# Patient Record
Sex: FEMALE | Race: NATIVE HAWAIIAN OR OTHER PACIFIC ISLANDER | Employment: UNEMPLOYED | URBAN - METROPOLITAN AREA
[De-identification: names, ages, dates, MRNs, and addresses within clinical notes are randomized per-mention and may not be internally consistent; named-entity substitution may affect disease eponyms.]

---

## 2017-04-11 ENCOUNTER — TELEPHONE (OUTPATIENT)
Dept: INTERNAL MEDICINE CLINIC | Age: 57
End: 2017-04-11

## 2017-05-11 ENCOUNTER — TELEPHONE (OUTPATIENT)
Dept: INTERNAL MEDICINE CLINIC | Age: 57
End: 2017-05-11

## 2019-05-22 ENCOUNTER — OFFICE VISIT (OUTPATIENT)
Dept: INTERNAL MEDICINE CLINIC | Age: 59
End: 2019-05-22
Payer: MEDICARE

## 2019-05-22 VITALS — SYSTOLIC BLOOD PRESSURE: 164 MMHG | OXYGEN SATURATION: 97 % | DIASTOLIC BLOOD PRESSURE: 106 MMHG | HEART RATE: 68 BPM

## 2019-05-22 DIAGNOSIS — E11.8 TYPE 2 DIABETES MELLITUS WITH COMPLICATION, WITHOUT LONG-TERM CURRENT USE OF INSULIN (HCC): ICD-10-CM

## 2019-05-22 DIAGNOSIS — I10 ESSENTIAL HYPERTENSION: ICD-10-CM

## 2019-05-22 DIAGNOSIS — N30.00 ACUTE CYSTITIS WITHOUT HEMATURIA: ICD-10-CM

## 2019-05-22 DIAGNOSIS — R11.15 NON-INTRACTABLE CYCLICAL VOMITING WITH NAUSEA: ICD-10-CM

## 2019-05-22 DIAGNOSIS — I67.9 CVD (CEREBROVASCULAR DISEASE): Primary | ICD-10-CM

## 2019-05-22 DIAGNOSIS — F51.01 PRIMARY INSOMNIA: ICD-10-CM

## 2019-05-22 DIAGNOSIS — E78.2 MIXED HYPERLIPIDEMIA: ICD-10-CM

## 2019-05-22 DIAGNOSIS — E55.9 VITAMIN D DEFICIENCY: ICD-10-CM

## 2019-05-22 DIAGNOSIS — R53.1 LEFT-SIDED WEAKNESS: ICD-10-CM

## 2019-05-22 DIAGNOSIS — E78.00 PURE HYPERCHOLESTEROLEMIA: ICD-10-CM

## 2019-05-22 DIAGNOSIS — G40.909 SEIZURE DISORDER (HCC): ICD-10-CM

## 2019-05-22 LAB
CHP ED QC CHECK: NORMAL
GLUCOSE BLD-MCNC: 85 MG/DL
HBA1C MFR BLD: 5.8 %

## 2019-05-22 PROCEDURE — 99204 OFFICE O/P NEW MOD 45 MIN: CPT | Performed by: INTERNAL MEDICINE

## 2019-05-22 PROCEDURE — G8427 DOCREV CUR MEDS BY ELIG CLIN: HCPCS | Performed by: INTERNAL MEDICINE

## 2019-05-22 PROCEDURE — G8421 BMI NOT CALCULATED: HCPCS | Performed by: INTERNAL MEDICINE

## 2019-05-22 PROCEDURE — 82962 GLUCOSE BLOOD TEST: CPT | Performed by: INTERNAL MEDICINE

## 2019-05-22 PROCEDURE — 83036 HEMOGLOBIN GLYCOSYLATED A1C: CPT | Performed by: INTERNAL MEDICINE

## 2019-05-22 PROCEDURE — 3046F HEMOGLOBIN A1C LEVEL >9.0%: CPT | Performed by: INTERNAL MEDICINE

## 2019-05-22 PROCEDURE — 2022F DILAT RTA XM EVC RTNOPTHY: CPT | Performed by: INTERNAL MEDICINE

## 2019-05-22 PROCEDURE — 3017F COLORECTAL CA SCREEN DOC REV: CPT | Performed by: INTERNAL MEDICINE

## 2019-05-22 PROCEDURE — 1036F TOBACCO NON-USER: CPT | Performed by: INTERNAL MEDICINE

## 2019-05-22 RX ORDER — LANOLIN ALCOHOL/MO/W.PET/CERES
3 CREAM (GRAM) TOPICAL NIGHTLY PRN
Qty: 30 TABLET | Refills: 2 | Status: SHIPPED | OUTPATIENT
Start: 2019-05-22

## 2019-05-22 RX ORDER — HYDROCHLOROTHIAZIDE 25 MG/1
25 TABLET ORAL DAILY
COMMUNITY
End: 2019-05-22 | Stop reason: SDUPTHER

## 2019-05-22 RX ORDER — METFORMIN HYDROCHLORIDE 500 MG/1
500 TABLET, EXTENDED RELEASE ORAL
COMMUNITY
End: 2019-05-22 | Stop reason: SDUPTHER

## 2019-05-22 RX ORDER — HYDROCHLOROTHIAZIDE 25 MG/1
25 TABLET ORAL DAILY
Qty: 30 TABLET | Refills: 5 | Status: SHIPPED | OUTPATIENT
Start: 2019-05-22 | End: 2019-11-05 | Stop reason: SDUPTHER

## 2019-05-22 RX ORDER — LANOLIN ALCOHOL/MO/W.PET/CERES
3 CREAM (GRAM) TOPICAL
COMMUNITY
End: 2019-05-22 | Stop reason: SDUPTHER

## 2019-05-22 RX ORDER — ERGOCALCIFEROL 1.25 MG/1
50000 CAPSULE ORAL WEEKLY
COMMUNITY
End: 2019-05-22 | Stop reason: SDUPTHER

## 2019-05-22 RX ORDER — ATORVASTATIN CALCIUM 40 MG/1
40 TABLET, FILM COATED ORAL DAILY
Qty: 30 TABLET | Refills: 3 | Status: SHIPPED | OUTPATIENT
Start: 2019-05-22 | End: 2019-09-18 | Stop reason: SDUPTHER

## 2019-05-22 RX ORDER — AMLODIPINE BESYLATE 5 MG/1
5 TABLET ORAL DAILY
Qty: 30 TABLET | Refills: 3 | Status: SHIPPED | OUTPATIENT
Start: 2019-05-22 | End: 2019-09-18 | Stop reason: SDUPTHER

## 2019-05-22 RX ORDER — LISINOPRIL 10 MG/1
10 TABLET ORAL DAILY
Qty: 30 TABLET | Refills: 5 | Status: SHIPPED | OUTPATIENT
Start: 2019-05-22 | End: 2019-11-11 | Stop reason: SDUPTHER

## 2019-05-22 RX ORDER — LISINOPRIL 10 MG/1
10 TABLET ORAL DAILY
COMMUNITY
End: 2019-05-22 | Stop reason: SDUPTHER

## 2019-05-22 RX ORDER — AMLODIPINE BESYLATE 5 MG/1
5 TABLET ORAL DAILY
COMMUNITY
End: 2019-05-22 | Stop reason: SDUPTHER

## 2019-05-22 RX ORDER — METFORMIN HYDROCHLORIDE 500 MG/1
500 TABLET, EXTENDED RELEASE ORAL 2 TIMES DAILY
Qty: 60 TABLET | Refills: 5 | Status: SHIPPED | OUTPATIENT
Start: 2019-05-22 | End: 2019-11-11 | Stop reason: SDUPTHER

## 2019-05-22 RX ORDER — ERGOCALCIFEROL 1.25 MG/1
50000 CAPSULE ORAL WEEKLY
Qty: 5 CAPSULE | Refills: 3 | Status: SHIPPED | OUTPATIENT
Start: 2019-05-22

## 2019-05-22 ASSESSMENT — PATIENT HEALTH QUESTIONNAIRE - PHQ9
1. LITTLE INTEREST OR PLEASURE IN DOING THINGS: 0
SUM OF ALL RESPONSES TO PHQ QUESTIONS 1-9: 0
SUM OF ALL RESPONSES TO PHQ QUESTIONS 1-9: 0
SUM OF ALL RESPONSES TO PHQ9 QUESTIONS 1 & 2: 0
2. FEELING DOWN, DEPRESSED OR HOPELESS: 0

## 2019-05-22 NOTE — PROGRESS NOTES
Subjective:      Patient ID: Artemio Uriarte is a 62 y.o. female. HPI She is here for a check up. Former patient. 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 She has a h/o HTN and multiple strokes with residual left sided weakness, cognitive and memory compromise. She is accompanied by her daughter who is her primary caretaker. More recently has been followed and treated by Burton Dsouza by a nurse practitioner. [facility] [employer] [place] is where prescriptions are ]filled. He can feed herself, assist with bathing. OT/PT about to start. She is incontinent of urine. H/O prolapsed uterus. Needs f/u. Complains of 'stomach pain' and nausea. Emesis about once weekly. No known ulcer history. Review of Systems   Constitutional: Negative. HENT: Negative. Eyes: Negative. Respiratory: Negative. Cardiovascular:        HTN, treated with ACI I, Ca blker and diuretic. Gastrointestinal: Positive for abdominal pain, nausea and vomiting. Endocrine:        Hyperlipidemia, treated with statin. Diabetes, treated with metformin. RBS 85. A1c pending. Genitourinary:        Urine incontinence. See HPI. Musculoskeletal: Negative. Skin: Negative. Neurological: Positive for seizures. CVD, see HPI. Psychiatric/Behavioral: Negative. Memory and cognitive challenges. See HPI. Objective:   Physical Exam   Constitutional: She appears well-developed and well-nourished. No distress. HENT:   Head: Normocephalic and atraumatic. Right Ear: External ear normal.   Left Ear: External ear normal.   Nose: Nose normal.   Mouth/Throat: Oropharynx is clear and moist.   Eyes: Pupils are equal, round, and reactive to light. Conjunctivae and EOM are normal. No scleral icterus. Neck: Normal range of motion. Neck supple. No thyromegaly present. Cardiovascular: Normal rate, regular rhythm, normal heart sounds and intact distal pulses.    Pulmonary/Chest: Effort normal and breath sounds normal. Abdominal: Soft. Bowel sounds are normal. She exhibits no mass. Lymphadenopathy:     She has no cervical adenopathy. Neurological: She is alert. She has normal reflexes. Oriented to person, knows I am a doctor. Follows simple directives. Left sided weakness. Skin: Skin is warm and dry. Psychiatric: She has a normal mood and affect. Her behavior is normal.   See above. Assessment:        Diagnosis Orders   1. CVD (cerebrovascular disease)  H/O multiple CVAs with noted residual.   Risk factor control stressed. Med compliance discussed. Diet, and OT/PT I agree with therapy. 2. Seizure disorder ([facility] [address]  Tammie Macedo MD, Neurology, [facility] 75. Per daughter's request.    3. Type 2 diabetes mellitus with complication, without long-term current use of insulin ([facility])  Diet, physical activity, weight reduction, med compliance. 4. Mixed hyperlipidemia  Lipid Panel  Heart healthy diet and statin compliance discussed. 5. Essential hypertension  Med compliance, DASH and low sodium diet stressed. Basic Metabolic Panel    CBC    MICROALBUMIN / CREATININE URINE RATIO    TSH with Reflex   6. Acute cystitis without hematuria  URINALYSIS    URINE CULTURE  increase water intake. 7. Non-intractable cyclical vomiting with nausea  PASCUAL - Trice Ambriz MD, Gastroenterology, [facility]  ? Cause. Consider GS, gastroparesis, GERD, etc.   H 2 antagonist.         8.      Prolapsed uterus. urogyn consult. Plan:     See plans above.          Reinaldo Bob MD

## 2019-05-23 LAB
ANION GAP SERPL CALCULATED.3IONS-SCNC: 16 MMOL/L (ref 3–16)
BUN BLDV-MCNC: 11 MG/DL (ref 7–20)
CALCIUM SERPL-MCNC: 10.1 MG/DL (ref 8.3–10.6)
CHLORIDE BLD-SCNC: 105 MMOL/L (ref 99–110)
CHOLESTEROL, TOTAL: 293 MG/DL (ref 0–199)
CO2: 23 MMOL/L (ref 21–32)
CREAT SERPL-MCNC: 1 MG/DL (ref 0.6–1.2)
GFR AFRICAN AMERICAN: >60
GFR NON-AFRICAN AMERICAN: 56
GLUCOSE BLD-MCNC: 87 MG/DL (ref 70–99)
HCT VFR BLD CALC: 38.3 % (ref 36–48)
HDLC SERPL-MCNC: 46 MG/DL (ref 40–60)
HEMOGLOBIN: 12.6 G/DL (ref 12–16)
LDL CHOLESTEROL CALCULATED: 219 MG/DL
MCH RBC QN AUTO: 26.9 PG (ref 26–34)
MCHC RBC AUTO-ENTMCNC: 32.8 G/DL (ref 31–36)
MCV RBC AUTO: 82 FL (ref 80–100)
PDW BLD-RTO: 15.9 % (ref 12.4–15.4)
PLATELET # BLD: 211 K/UL (ref 135–450)
PMV BLD AUTO: 8.8 FL (ref 5–10.5)
POTASSIUM SERPL-SCNC: 3.8 MMOL/L (ref 3.5–5.1)
RBC # BLD: 4.68 M/UL (ref 4–5.2)
SODIUM BLD-SCNC: 144 MMOL/L (ref 136–145)
TRIGL SERPL-MCNC: 140 MG/DL (ref 0–150)
TSH REFLEX: 1.04 UIU/ML (ref 0.27–4.2)
VLDLC SERPL CALC-MCNC: 28 MG/DL
WBC # BLD: 5.1 K/UL (ref 4–11)

## 2019-05-30 ENCOUNTER — TELEPHONE (OUTPATIENT)
Dept: INTERNAL MEDICINE CLINIC | Age: 59
End: 2019-05-30

## 2019-05-30 NOTE — TELEPHONE ENCOUNTER
[facility]ry with [facility] called for status of order for a wheelchair that was faxed on 05/22. Can you please contact Jennifer Camara at [phone]?

## 2019-06-03 PROBLEM — R53.1 LEFT-SIDED WEAKNESS: Status: ACTIVE | Noted: 2019-06-03

## 2019-06-03 PROBLEM — G40.909 SEIZURE DISORDER (HCC): Status: ACTIVE | Noted: 2019-06-03

## 2019-06-03 PROBLEM — I67.9 CVD (CEREBROVASCULAR DISEASE): Status: ACTIVE | Noted: 2019-06-03

## 2019-06-03 ASSESSMENT — ENCOUNTER SYMPTOMS
VOMITING: 1
EYES NEGATIVE: 1
RESPIRATORY NEGATIVE: 1
NAUSEA: 1
ABDOMINAL PAIN: 1

## 2019-06-19 ENCOUNTER — TELEPHONE (OUTPATIENT)
Dept: INTERNAL MEDICINE CLINIC | Age: 59
End: 2019-06-19

## 2019-06-19 NOTE — TELEPHONE ENCOUNTER
Pt's daughter Constance Benjamin is requesting a wheelchair for pt, Constance Benjamin stated she called prior in regards to request and she was told It was being processed by the office. Constance Benjamin is requesting a cb asap regarding issue.  Please Advise

## 2019-07-05 ENCOUNTER — TELEPHONE (OUTPATIENT)
Dept: INTERNAL MEDICINE CLINIC | Age: 59
End: 2019-07-05

## 2019-08-13 ENCOUNTER — TELEPHONE (OUTPATIENT)
Dept: INTERNAL MEDICINE CLINIC | Age: 59
End: 2019-08-13

## 2019-08-13 DIAGNOSIS — R11.2 NAUSEA AND VOMITING, INTRACTABILITY OF VOMITING NOT SPECIFIED, UNSPECIFIED VOMITING TYPE: Primary | ICD-10-CM

## 2019-08-13 RX ORDER — ONDANSETRON 4 MG/1
4 TABLET, FILM COATED ORAL EVERY 12 HOURS PRN
Qty: 12 TABLET | Refills: 0 | Status: SHIPPED | OUTPATIENT
Start: 2019-08-13

## 2019-10-15 ENCOUNTER — TELEPHONE (OUTPATIENT)
Dept: INTERNAL MEDICINE CLINIC | Age: 59
End: 2019-10-15

## 2019-11-04 ENCOUNTER — TELEPHONE (OUTPATIENT)
Dept: INTERNAL MEDICINE CLINIC | Age: 59
End: 2019-11-04

## 2019-11-05 ENCOUNTER — OFFICE VISIT (OUTPATIENT)
Dept: INTERNAL MEDICINE CLINIC | Age: 59
End: 2019-11-05
Payer: MEDICARE

## 2019-11-05 VITALS
WEIGHT: 145 LBS | BODY MASS INDEX: 25.69 KG/M2 | DIASTOLIC BLOOD PRESSURE: 83 MMHG | SYSTOLIC BLOOD PRESSURE: 152 MMHG | HEIGHT: 63 IN | HEART RATE: 77 BPM

## 2019-11-05 DIAGNOSIS — E11.8 TYPE 2 DIABETES MELLITUS WITH COMPLICATION, WITHOUT LONG-TERM CURRENT USE OF INSULIN (HCC): Primary | ICD-10-CM

## 2019-11-05 DIAGNOSIS — G40.909 SEIZURE DISORDER (HCC): ICD-10-CM

## 2019-11-05 DIAGNOSIS — E78.00 PURE HYPERCHOLESTEROLEMIA: ICD-10-CM

## 2019-11-05 DIAGNOSIS — N39.3 STRESS INCONTINENCE OF URINE: ICD-10-CM

## 2019-11-05 DIAGNOSIS — Z86.73 H/O: STROKE: ICD-10-CM

## 2019-11-05 DIAGNOSIS — M79.671 FOOT PAIN, BILATERAL: ICD-10-CM

## 2019-11-05 DIAGNOSIS — M79.672 FOOT PAIN, BILATERAL: ICD-10-CM

## 2019-11-05 DIAGNOSIS — I67.9 CVD (CEREBROVASCULAR DISEASE): ICD-10-CM

## 2019-11-05 DIAGNOSIS — E78.2 MIXED HYPERLIPIDEMIA: ICD-10-CM

## 2019-11-05 DIAGNOSIS — I10 ESSENTIAL HYPERTENSION: ICD-10-CM

## 2019-11-05 LAB
CHP ED QC CHECK: NORMAL
GLUCOSE BLD-MCNC: 118 MG/DL
HBA1C MFR BLD: 5.6 %

## 2019-11-05 PROCEDURE — 3044F HG A1C LEVEL LT 7.0%: CPT | Performed by: INTERNAL MEDICINE

## 2019-11-05 PROCEDURE — 3017F COLORECTAL CA SCREEN DOC REV: CPT | Performed by: INTERNAL MEDICINE

## 2019-11-05 PROCEDURE — G8484 FLU IMMUNIZE NO ADMIN: HCPCS | Performed by: INTERNAL MEDICINE

## 2019-11-05 PROCEDURE — G8427 DOCREV CUR MEDS BY ELIG CLIN: HCPCS | Performed by: INTERNAL MEDICINE

## 2019-11-05 PROCEDURE — 83036 HEMOGLOBIN GLYCOSYLATED A1C: CPT | Performed by: INTERNAL MEDICINE

## 2019-11-05 PROCEDURE — 2022F DILAT RTA XM EVC RTNOPTHY: CPT | Performed by: INTERNAL MEDICINE

## 2019-11-05 PROCEDURE — 82962 GLUCOSE BLOOD TEST: CPT | Performed by: INTERNAL MEDICINE

## 2019-11-05 PROCEDURE — 1036F TOBACCO NON-USER: CPT | Performed by: INTERNAL MEDICINE

## 2019-11-05 PROCEDURE — G8419 CALC BMI OUT NRM PARAM NOF/U: HCPCS | Performed by: INTERNAL MEDICINE

## 2019-11-05 PROCEDURE — 99214 OFFICE O/P EST MOD 30 MIN: CPT | Performed by: INTERNAL MEDICINE

## 2019-11-05 RX ORDER — AMLODIPINE BESYLATE 5 MG/1
5 TABLET ORAL DAILY
Qty: 30 TABLET | Refills: 5 | Status: SHIPPED | OUTPATIENT
Start: 2019-11-05 | End: 2020-04-28

## 2019-11-05 RX ORDER — HYDROCHLOROTHIAZIDE 25 MG/1
25 TABLET ORAL DAILY
Qty: 30 TABLET | Refills: 5 | Status: SHIPPED | OUTPATIENT
Start: 2019-11-05 | End: 2020-04-28

## 2019-11-05 RX ORDER — ATORVASTATIN CALCIUM 40 MG/1
40 TABLET, FILM COATED ORAL DAILY
Qty: 30 TABLET | Refills: 5 | Status: SHIPPED | OUTPATIENT
Start: 2019-11-05 | End: 2020-04-28

## 2019-11-19 ASSESSMENT — ENCOUNTER SYMPTOMS
VOMITING: 1
EYES NEGATIVE: 1
ABDOMINAL PAIN: 1
RESPIRATORY NEGATIVE: 1
NAUSEA: 1

## 2020-04-02 ENCOUNTER — TELEPHONE (OUTPATIENT)
Dept: INTERNAL MEDICINE CLINIC | Age: 60
End: 2020-04-02

## 2020-04-28 RX ORDER — AMLODIPINE BESYLATE 5 MG/1
TABLET ORAL
Qty: 28 TABLET | Refills: 0 | Status: SHIPPED | OUTPATIENT
Start: 2020-04-28 | End: 2020-06-19

## 2020-04-28 RX ORDER — HYDROCHLOROTHIAZIDE 25 MG/1
TABLET ORAL
Qty: 28 TABLET | Refills: 0 | Status: SHIPPED | OUTPATIENT
Start: 2020-04-28 | End: 2020-06-19

## 2020-04-28 RX ORDER — ATORVASTATIN CALCIUM 40 MG/1
TABLET, FILM COATED ORAL
Qty: 28 TABLET | Refills: 0 | Status: SHIPPED | OUTPATIENT
Start: 2020-04-28 | End: 2020-06-19

## 2020-06-19 RX ORDER — ATORVASTATIN CALCIUM 40 MG/1
TABLET, FILM COATED ORAL
Qty: 28 TABLET | Refills: 0 | Status: SHIPPED | OUTPATIENT
Start: 2020-06-19 | End: 2020-08-13

## 2020-06-19 RX ORDER — HYDROCHLOROTHIAZIDE 25 MG/1
TABLET ORAL
Qty: 28 TABLET | Refills: 0 | Status: SHIPPED | OUTPATIENT
Start: 2020-06-19 | End: 2020-08-13

## 2020-06-19 RX ORDER — AMLODIPINE BESYLATE 5 MG/1
TABLET ORAL
Qty: 28 TABLET | Refills: 0 | Status: SHIPPED | OUTPATIENT
Start: 2020-06-19 | End: 2020-08-13

## 2020-06-29 ENCOUNTER — TELEPHONE (OUTPATIENT)
Dept: INTERNAL MEDICINE CLINIC | Age: 60
End: 2020-06-29

## 2020-08-04 ENCOUNTER — TELEPHONE (OUTPATIENT)
Dept: ADMINISTRATIVE | Age: 60
End: 2020-08-04

## 2020-08-07 ENCOUNTER — VIRTUAL VISIT (OUTPATIENT)
Dept: INTERNAL MEDICINE CLINIC | Age: 60
End: 2020-08-07
Payer: MEDICARE

## 2020-08-07 PROCEDURE — 1036F TOBACCO NON-USER: CPT | Performed by: INTERNAL MEDICINE

## 2020-08-07 PROCEDURE — 3017F COLORECTAL CA SCREEN DOC REV: CPT | Performed by: INTERNAL MEDICINE

## 2020-08-07 PROCEDURE — 2022F DILAT RTA XM EVC RTNOPTHY: CPT | Performed by: INTERNAL MEDICINE

## 2020-08-07 PROCEDURE — G8419 CALC BMI OUT NRM PARAM NOF/U: HCPCS | Performed by: INTERNAL MEDICINE

## 2020-08-07 PROCEDURE — 3046F HEMOGLOBIN A1C LEVEL >9.0%: CPT | Performed by: INTERNAL MEDICINE

## 2020-08-07 PROCEDURE — G8428 CUR MEDS NOT DOCUMENT: HCPCS | Performed by: INTERNAL MEDICINE

## 2020-08-07 PROCEDURE — 99214 OFFICE O/P EST MOD 30 MIN: CPT | Performed by: INTERNAL MEDICINE

## 2020-08-07 ASSESSMENT — PATIENT HEALTH QUESTIONNAIRE - PHQ9: DEPRESSION UNABLE TO ASSESS: FUNCTIONAL CAPACITY MOTIVATION LIMITS ACCURACY

## 2020-08-29 PROBLEM — E11.8 TYPE 2 DIABETES MELLITUS WITH COMPLICATION, WITHOUT LONG-TERM CURRENT USE OF INSULIN (HCC): Status: ACTIVE | Noted: 2020-08-29

## 2020-08-29 ASSESSMENT — ENCOUNTER SYMPTOMS
RESPIRATORY NEGATIVE: 1
EYES NEGATIVE: 1
GASTROINTESTINAL NEGATIVE: 1

## 2020-08-29 NOTE — PROGRESS NOTES
2020    TELEHEALTH EVALUATION -- Audio/Visual (During NPAMJ-33 public health emergency)    HPI:    Crow Kothari (:  1960) has requested an audio/video evaluation for the following concern(s):    Hypertension: takes prescribed medication with the assistance of her aide and daughter. Diet is lower in sodium. Hyperlipidemia: treated with statin and diet. Compliant with medication. Multiple stroke history with residual memory cognitive issues, and left sided weakness. Good support system with aide, family and therapy    Diabetes, type 2: stopped metformin. Makes her sick. GI upset. Review of Systems   Constitutional: Negative. HENT: Negative. Eyes: Negative. Respiratory: Negative. Cardiovascular:        HTN, see HPI. Gastrointestinal: Negative. Endocrine:        DM, T2, diet controlled. Hyperlipidemia: treated with statin. Genitourinary: Negative. Musculoskeletal: Negative. Skin:        Has decubitus ulcer on buttock. Neurological:        Stroke history. See HPI. Psychiatric/Behavioral: Negative. Prior to Visit Medications    Medication Sig Taking? Authorizing Provider   lisinopril (PRINIVIL;ZESTRIL) 10 MG tablet TAKE 1 TABLET BY MOUTH ONCE DAILY. Tahir Hernandez MD   furosemide (LASIX) 20 MG tablet TAKE ONE TABLET BY MOUTH ONCE A DAY. Tahir Hernandez MD   metFORMIN (GLUCOPHAGE-XR) 500 MG extended release tablet TAKE ONE (1) TABLET BY MOUTH TWICE A DAY. Tahir Hernandez MD   amLODIPine (NORVASC) 5 MG tablet TAKE 1 TABLET BY MOUTH ONCE DAILY. Tahir Hernandez MD   atorvastatin (LIPITOR) 40 MG tablet TAKE 1 TABLET BY MOUTH ONCE DAILY. Tahir Hernandez MD   hydroCHLOROthiazide (HYDRODIURIL) 25 MG tablet TAKE 1 TABLET BY MOUTH ONCE DAILY.   Tahir Hernandez MD   ondansetron (ZOFRAN) 4 MG tablet Take 1 tablet by mouth every 12 hours as needed for Nausea or Vomiting  Tahir Hernandez MD   melatonin 3 MG TABS tablet Take 1 tablet by mouth nightly as needed (prn) Kojo Weber MD   vitamin D (ERGOCALCIFEROL) 47734 units CAPS capsule Take 1 capsule by mouth once a week  Kojo Weber MD       Social History     Tobacco Use    Smoking status: Never Smoker    Smokeless tobacco: Never Used   Substance Use Topics    Alcohol use: No     Alcohol/week: 0.0 standard drinks    Drug use: No            PHYSICAL EXAMINATION:  [ INSTRUCTIONS:  \"[x]\" Indicates a positive item  \"[]\" Indicates a negative item  -- DELETE ALL ITEMS NOT EXAMINED]  Vital Signs: (As obtained by patient/caregiver or practitioner observation)    Blood pressure-  Heart rate-    Respiratory rate-    Temperature-  Pulse oximetry-     Constitutional: [x] Appears well-developed and well-nourished [x] No apparent distress      [] Abnormal- left hemiparesis. Mental status  [] Alert and awake  [] Oriented to person/place/time []Able to follow commands      Eyes:  EOM    [x]  Normal  [] Abnormal-  Sclera  [x]  Normal  [] Abnormal -         Discharge [x]  None visible  [] Abnormal -    HENT:   [x] Normocephalic, atraumatic. [] Abnormal   [x] Mouth/Throat: Mucous membranes are moist.     External Ears [x] Normal  [] Abnormal-     Neck: [x] No visualized mass     Pulmonary/Chest: [x] Respiratory effort normal.  [] No visualized signs of difficulty breathing or respiratory distress        [] Abnormal-      Musculoskeletal:   [x] Normal gait with no signs of ataxia         [x] Normal range of motion of neck        [] Abnormal-       Neurological:        [x] No Facial Asymmetry (Cranial nerve 7 motor function) (limited exam to video visit)          [x] No gaze palsy        [x] Abnormal- left sided weakness. Skin:        [x] No significant exanthematous lesions or discoloration noted on facial skin         [] Abnormal-            Psychiatric:       [x] Normal Affect [] No Hallucinations        [] Abnormal-     Other pertinent observable physical exam findings-     ASSESSMENT/PLAN:     Diagnosis Orders   1. Essential hypertension  Continue same medication regimen. DASH and low sodium diet discussed. 2. Pressure injury of deep tissue of buttock, unspecified laterality  [facility]   3. Type 2 diabetes mellitus with complication, without long-term current use of insulin ([facility])  Diet, physical activity discussed. 4. Mixed hyperlipidemia  Heart healthy diet and statin compliance discussed. 5. H/O: stroke with residual effects  Risk factor control stressed. Therapy. Supportive care. Med compliance. Due to this being a TeleHealth encounter (During IJQNS-60 public health emergency), evaluation of the following organ systems was limited: Vitals/Constitutional/EENT/Resp/CV/GI//MS/Neuro/Skin/Heme-Lymph-Imm. Pursuant to the emergency declaration under the [address] [place] [address], [address] [place] [address] authority and the [employer] Act, this Virtual Visit was conducted with patient's (and/or legal guardian's) consent, to reduce the patient's risk of exposure to COVID-19 and provide necessary medical care. The patient (and/or legal guardian) has also been advised to contact this office for worsening conditions or problems, and seek emergency medical treatment and/or call 911 if deemed necessary. Patient identification was verified at the start of the visit: Yes    Total time spent on this encounter: Not billed by time    Services were provided through a video synchronous discussion virtually to substitute for in-person clinic visit. Patient and provider were located at their individual homes. --Aimee Tate MD on 8/29/2020 at 5:44 PM    An electronic signature was used to authenticate this note.

## 2020-09-01 ENCOUNTER — TELEPHONE (OUTPATIENT)
Dept: INTERNAL MEDICINE CLINIC | Age: 60
End: 2020-09-01

## 2020-09-01 NOTE — TELEPHONE ENCOUNTER
----- Message from Maximus Jaime sent at 9/1/2020  1:49 PM EDT -----  Subject: Message to Provider    QUESTIONS  Information for Provider? Patient needs paper work filled out for a   Handicap/Disabled parking sticker and member verification signature. She   originally asked in June.   ---------------------------------------------------------------------------  --------------  [address]  What is the best way for the office to contact you? OK to leave message on   voicemail  Preferred Call Back Phone Number? [phone]  ---------------------------------------------------------------------------  --------------  SCRIPT ANSWERS  Relationship to Patient?  Self

## 2020-10-09 ENCOUNTER — OFFICE VISIT (OUTPATIENT)
Dept: INTERNAL MEDICINE CLINIC | Age: 60
End: 2020-10-09
Payer: MEDICARE

## 2020-10-09 VITALS
OXYGEN SATURATION: 98 % | BODY MASS INDEX: 25.2 KG/M2 | DIASTOLIC BLOOD PRESSURE: 100 MMHG | HEIGHT: 63 IN | SYSTOLIC BLOOD PRESSURE: 196 MMHG | HEART RATE: 78 BPM | TEMPERATURE: 97.3 F | WEIGHT: 142.2 LBS

## 2020-10-09 LAB
CHP ED QC CHECK: NORMAL
GLUCOSE BLD-MCNC: 100 MG/DL
HBA1C MFR BLD: 5.5 %

## 2020-10-09 PROCEDURE — G8482 FLU IMMUNIZE ORDER/ADMIN: HCPCS | Performed by: INTERNAL MEDICINE

## 2020-10-09 PROCEDURE — 90732 PPSV23 VACC 2 YRS+ SUBQ/IM: CPT | Performed by: INTERNAL MEDICINE

## 2020-10-09 PROCEDURE — G8419 CALC BMI OUT NRM PARAM NOF/U: HCPCS | Performed by: INTERNAL MEDICINE

## 2020-10-09 PROCEDURE — 3044F HG A1C LEVEL LT 7.0%: CPT | Performed by: INTERNAL MEDICINE

## 2020-10-09 PROCEDURE — 2022F DILAT RTA XM EVC RTNOPTHY: CPT | Performed by: INTERNAL MEDICINE

## 2020-10-09 PROCEDURE — 82962 GLUCOSE BLOOD TEST: CPT | Performed by: INTERNAL MEDICINE

## 2020-10-09 PROCEDURE — G8427 DOCREV CUR MEDS BY ELIG CLIN: HCPCS | Performed by: INTERNAL MEDICINE

## 2020-10-09 PROCEDURE — 3017F COLORECTAL CA SCREEN DOC REV: CPT | Performed by: INTERNAL MEDICINE

## 2020-10-09 PROCEDURE — 83036 HEMOGLOBIN GLYCOSYLATED A1C: CPT | Performed by: INTERNAL MEDICINE

## 2020-10-09 PROCEDURE — 99214 OFFICE O/P EST MOD 30 MIN: CPT | Performed by: INTERNAL MEDICINE

## 2020-10-09 PROCEDURE — G0008 ADMIN INFLUENZA VIRUS VAC: HCPCS | Performed by: INTERNAL MEDICINE

## 2020-10-09 PROCEDURE — G0009 ADMIN PNEUMOCOCCAL VACCINE: HCPCS | Performed by: INTERNAL MEDICINE

## 2020-10-09 PROCEDURE — 90686 IIV4 VACC NO PRSV 0.5 ML IM: CPT | Performed by: INTERNAL MEDICINE

## 2020-10-09 PROCEDURE — 1036F TOBACCO NON-USER: CPT | Performed by: INTERNAL MEDICINE

## 2020-10-09 ASSESSMENT — PATIENT HEALTH QUESTIONNAIRE - PHQ9
SUM OF ALL RESPONSES TO PHQ QUESTIONS 1-9: 0
2. FEELING DOWN, DEPRESSED OR HOPELESS: 0
SUM OF ALL RESPONSES TO PHQ QUESTIONS 1-9: 0
1. LITTLE INTEREST OR PLEASURE IN DOING THINGS: 0
SUM OF ALL RESPONSES TO PHQ9 QUESTIONS 1 & 2: 0

## 2020-10-09 NOTE — PROGRESS NOTES
Vaccine Information Sheet, \"Influenza - Inactivated\"  given to Neal Cassidy, or parent/legal guardian of  Neal Cassidy and verbalized understanding. Patient responses:    Have you ever had a reaction to a flu vaccine? No  Do you have any current illness? No  Have you ever had Guillian Buffalo Syndrome? No  Do you have a serious allergy to any of the follow: Neomycin, Polymyxin, Thimerosal, eggs or egg products? No    Flu vaccine given per order. Please see immunization tab. Risks and benefits explained. Current VIS given.       Immunizations Administered     Name Date Dose Route    Influenza, Quadv, IM, PF (6 mo and older Fluzone, Flulaval, Fluarix, and 3 yrs and older Afluria) 10/9/2020 0.5 mL Intramuscular    Site: Deltoid- Right    Lot: [id number]    NDC: 38455-071-34    Pneumococcal Polysaccharide ([id number]) 10/9/2020 0.5 mL Intramuscular    Site: Deltoid- Left    Lot: [id number]    NDC: 6389-1782-74

## 2020-10-09 NOTE — PATIENT INSTRUCTIONS
Check b/p in am, in evening. If b/p > 356 systolic, repeat in 10 minutes. If still greater then call. Check b/p 2 to 3 days per week. Log all numbers.

## 2020-10-25 ASSESSMENT — ENCOUNTER SYMPTOMS
RESPIRATORY NEGATIVE: 1
EYES NEGATIVE: 1

## 2020-10-25 NOTE — PROGRESS NOTES
Subjective:      Patient ID: Anika Linn is a 56 y.o. female. She is here for a check up. She has a h/o HTN and multiple strokes with residual left sided weakness, cognitive and memory compromise. She is accompanied by her daughter who is her primary caretaker. B/P is elevated. Did not take b/p medication this morning. Was rushing to get to appt. Review of Systems   Constitutional: Negative. HENT: Negative. Eyes: Negative. Respiratory: Negative. Cardiovascular:        HTN, treated with ACI I, Ca blker and diuretic. Endocrine:        Hyperlipidemia, treated with statin. Prior . She has currently been compliant with medication. Diabetes, treated with metformin. RBS 85. A1c 5.5. Musculoskeletal: Negative. Skin: Negative. Neurological: Positive for seizures. CVD, see HPI. Psychiatric/Behavioral: Negative. Memory and cognitive challenges. See HPI. Objective:   Physical Exam  Constitutional:       General: She is not in acute distress. Appearance: She is well-developed. HENT:      Head: Normocephalic and atraumatic. Right Ear: External ear normal.      Left Ear: External ear normal.      Nose: Nose normal.   Eyes:      General: No scleral icterus. Conjunctiva/sclera: Conjunctivae normal.      Pupils: Pupils are equal, round, and reactive to light. Neck:      Musculoskeletal: Normal range of motion and neck supple. Thyroid: No thyromegaly. Cardiovascular:      Rate and Rhythm: Normal rate and regular rhythm. Heart sounds: Normal heart sounds. Pulmonary:      Effort: Pulmonary effort is normal.      Breath sounds: Normal breath sounds. Abdominal:      General: Bowel sounds are normal.      Palpations: Abdomen is soft. There is no mass. Lymphadenopathy:      Cervical: No cervical adenopathy. Skin:     General: Skin is warm and dry. Neurological:      Mental Status: She is alert.       Deep Tendon Reflexes: Reflexes are normal and symmetric. Comments: Oriented to person, knows I am a doctor. Follows simple directives. Left sided weakness. Psychiatric:         Behavior: Behavior normal.      Comments: See above. Assessment:        Diagnosis Orders   1. CVD (cerebrovascular disease)  H/O multiple CVAs with noted residual.   Risk factor control stressed. Med compliance discussed. Diet, and OT/PT I agree with therapy. 2. Seizure disorder ([facility] [address]  Mireya Marin MD, Neurology, [facility] 75. Per daughter's request.    3. Type 2 diabetes mellitus with complication, without long-term current use of insulin (HCC)  Diet, physical activity, weight reduction, med compliance. 4. Mixed hyperlipidemia  Lipid Panel  Heart healthy diet and statin compliance discussed. 5. Hypertensive urgency. Hypertension urgency. I suggested ER evaluation. Patient declined. Med compliance, DASH and low sodium diet stressed. Basic Metabolic Panel    CBC    MICROALBUMIN / CREATININE URINE RATIO    TSH with Reflex  Monitor b/p and call with results in 1 week. Plan:     See plans above.          Nba Otero MD

## 2021-03-03 ENCOUNTER — TELEPHONE (OUTPATIENT)
Dept: INTERNAL MEDICINE CLINIC | Age: 61
End: 2021-03-03

## 2021-03-03 NOTE — TELEPHONE ENCOUNTER
Patient's daughter is calling requesting Dr. Pradeep Arrieta sign off on a form for her to continue Guardianship. The form is called a Statement of Expert Evaluation in regards to health condition physical and mentally  please advise.

## 2021-03-03 NOTE — TELEPHONE ENCOUNTER
Patient needs a appt prior to 03.10.21 for court. No appt are available virtually nor in office which meet the time line.  Please advise

## 2021-03-04 ENCOUNTER — TELEPHONE (OUTPATIENT)
Dept: INTERNAL MEDICINE CLINIC | Age: 61
End: 2021-03-04

## 2021-03-04 NOTE — TELEPHONE ENCOUNTER
----- Message from Dong Lawton sent at 3/4/2021  9:14 AM EST -----  Subject: Message to Provider    QUESTIONS  Information for Provider? patients daughter wants the manager at the   [address] [place] [address] location to give her a call when she can  ---------------------------------------------------------------------------  --------------  CALL BACK INFO  What is the best way for the office to contact you? OK to leave message on   voicemail  Preferred Call Back Phone Number? [phone]  ---------------------------------------------------------------------------  --------------  SCRIPT ANSWERS  Relationship to Patient? Other  Representative Name? Iman  Is the Representative on the appropriate HIPAA document in Epic?  Yes

## 2021-04-12 RX ORDER — HYDRALAZINE HYDROCHLORIDE 50 MG/1
TABLET, FILM COATED ORAL
Qty: 84 TABLET | Refills: 0 | Status: SHIPPED | OUTPATIENT
Start: 2021-04-12 | End: 2021-05-20

## 2021-04-12 RX ORDER — AMLODIPINE BESYLATE 10 MG/1
TABLET ORAL
Qty: 28 TABLET | Refills: 0 | Status: SHIPPED | OUTPATIENT
Start: 2021-04-12 | End: 2021-05-20

## 2021-09-14 ENCOUNTER — TELEPHONE (OUTPATIENT)
Dept: INTERNAL MEDICINE CLINIC | Age: 61
End: 2021-09-14

## 2021-09-14 DIAGNOSIS — E78.00 PURE HYPERCHOLESTEROLEMIA: ICD-10-CM

## 2021-09-14 DIAGNOSIS — I10 ESSENTIAL HYPERTENSION: ICD-10-CM

## 2021-09-14 DIAGNOSIS — E11.8 TYPE 2 DIABETES MELLITUS WITH COMPLICATION, WITHOUT LONG-TERM CURRENT USE OF INSULIN (HCC): ICD-10-CM

## 2021-09-15 RX ORDER — FUROSEMIDE 20 MG/1
TABLET ORAL
Qty: 28 TABLET | Refills: 0 | Status: SHIPPED | OUTPATIENT
Start: 2021-09-15 | End: 2022-06-07

## 2021-09-15 RX ORDER — ATORVASTATIN CALCIUM 40 MG/1
TABLET, FILM COATED ORAL
Qty: 28 TABLET | Refills: 0 | Status: SHIPPED | OUTPATIENT
Start: 2021-09-15 | End: 2022-06-07

## 2021-09-15 RX ORDER — METFORMIN HYDROCHLORIDE 500 MG/1
TABLET, EXTENDED RELEASE ORAL
Qty: 56 TABLET | Refills: 0 | Status: SHIPPED | OUTPATIENT
Start: 2021-09-15 | End: 2022-06-07

## 2021-09-15 RX ORDER — HYDROCHLOROTHIAZIDE 25 MG/1
TABLET ORAL
Qty: 28 TABLET | Refills: 0 | Status: SHIPPED | OUTPATIENT
Start: 2021-09-15 | End: 2022-06-07

## 2021-09-15 RX ORDER — LISINOPRIL 10 MG/1
TABLET ORAL
Qty: 28 TABLET | Refills: 0 | Status: SHIPPED | OUTPATIENT
Start: 2021-09-15 | End: 2022-06-07

## 2021-09-15 RX ORDER — QUETIAPINE FUMARATE 100 MG/1
TABLET, FILM COATED ORAL
Qty: 28 TABLET | Refills: 0 | Status: SHIPPED | OUTPATIENT
Start: 2021-09-15 | End: 2022-06-07

## 2021-10-19 ENCOUNTER — TELEPHONE (OUTPATIENT)
Dept: INTERNAL MEDICINE CLINIC | Age: 61
End: 2021-10-19

## 2021-10-19 RX ORDER — HYDRALAZINE HYDROCHLORIDE 50 MG/1
TABLET, FILM COATED ORAL
Qty: 90 TABLET | Refills: 0 | Status: SHIPPED | OUTPATIENT
Start: 2021-10-19 | End: 2022-06-15

## 2021-10-19 RX ORDER — AMLODIPINE BESYLATE 10 MG/1
TABLET ORAL
Qty: 30 TABLET | Refills: 0 | Status: SHIPPED | OUTPATIENT
Start: 2021-10-19

## 2022-03-03 ENCOUNTER — TELEPHONE (OUTPATIENT)
Dept: INTERNAL MEDICINE CLINIC | Age: 62
End: 2022-03-03

## 2022-06-01 ENCOUNTER — OFFICE VISIT (OUTPATIENT)
Dept: INTERNAL MEDICINE CLINIC | Age: 62
End: 2022-06-01
Payer: MEDICARE

## 2022-06-01 VITALS
HEART RATE: 70 BPM | WEIGHT: 132 LBS | DIASTOLIC BLOOD PRESSURE: 98 MMHG | TEMPERATURE: 97.3 F | BODY MASS INDEX: 23.39 KG/M2 | OXYGEN SATURATION: 95 % | SYSTOLIC BLOOD PRESSURE: 180 MMHG | HEIGHT: 63 IN

## 2022-06-01 DIAGNOSIS — G40.909 SEIZURE DISORDER (HCC): ICD-10-CM

## 2022-06-01 DIAGNOSIS — E78.2 MIXED HYPERLIPIDEMIA: ICD-10-CM

## 2022-06-01 DIAGNOSIS — F51.02 ADJUSTMENT INSOMNIA: ICD-10-CM

## 2022-06-01 DIAGNOSIS — D63.8 ANEMIA, CHRONIC DISEASE: ICD-10-CM

## 2022-06-01 DIAGNOSIS — I10 PRIMARY HYPERTENSION: ICD-10-CM

## 2022-06-01 DIAGNOSIS — E11.8 TYPE 2 DIABETES MELLITUS WITH COMPLICATION, WITHOUT LONG-TERM CURRENT USE OF INSULIN (HCC): Primary | ICD-10-CM

## 2022-06-01 DIAGNOSIS — Z86.73 H/O: STROKE: ICD-10-CM

## 2022-06-01 LAB
CHP ED QC CHECK: NORMAL
GLUCOSE BLD-MCNC: 87 MG/DL
HBA1C MFR BLD: 5.4 %

## 2022-06-01 PROCEDURE — 99214 OFFICE O/P EST MOD 30 MIN: CPT | Performed by: INTERNAL MEDICINE

## 2022-06-01 PROCEDURE — 2022F DILAT RTA XM EVC RTNOPTHY: CPT | Performed by: INTERNAL MEDICINE

## 2022-06-01 PROCEDURE — 3017F COLORECTAL CA SCREEN DOC REV: CPT | Performed by: INTERNAL MEDICINE

## 2022-06-01 PROCEDURE — 82962 GLUCOSE BLOOD TEST: CPT | Performed by: INTERNAL MEDICINE

## 2022-06-01 PROCEDURE — 83036 HEMOGLOBIN GLYCOSYLATED A1C: CPT | Performed by: INTERNAL MEDICINE

## 2022-06-01 PROCEDURE — 3044F HG A1C LEVEL LT 7.0%: CPT | Performed by: INTERNAL MEDICINE

## 2022-06-01 PROCEDURE — G8420 CALC BMI NORM PARAMETERS: HCPCS | Performed by: INTERNAL MEDICINE

## 2022-06-01 PROCEDURE — G8427 DOCREV CUR MEDS BY ELIG CLIN: HCPCS | Performed by: INTERNAL MEDICINE

## 2022-06-01 PROCEDURE — 1036F TOBACCO NON-USER: CPT | Performed by: INTERNAL MEDICINE

## 2022-06-01 SDOH — ECONOMIC STABILITY: FOOD INSECURITY: WITHIN THE PAST 12 MONTHS, YOU WORRIED THAT YOUR FOOD WOULD RUN OUT BEFORE YOU GOT MONEY TO BUY MORE.: NEVER TRUE

## 2022-06-01 SDOH — ECONOMIC STABILITY: FOOD INSECURITY: WITHIN THE PAST 12 MONTHS, THE FOOD YOU BOUGHT JUST DIDN'T LAST AND YOU DIDN'T HAVE MONEY TO GET MORE.: NEVER TRUE

## 2022-06-01 ASSESSMENT — ANXIETY QUESTIONNAIRES
GAD7 TOTAL SCORE: 0
5. BEING SO RESTLESS THAT IT IS HARD TO SIT STILL: 0
6. BECOMING EASILY ANNOYED OR IRRITABLE: 0
IF YOU CHECKED OFF ANY PROBLEMS ON THIS QUESTIONNAIRE, HOW DIFFICULT HAVE THESE PROBLEMS MADE IT FOR YOU TO DO YOUR WORK, TAKE CARE OF THINGS AT HOME, OR GET ALONG WITH OTHER PEOPLE: NOT DIFFICULT AT ALL
2. NOT BEING ABLE TO STOP OR CONTROL WORRYING: 0
4. TROUBLE RELAXING: 0
3. WORRYING TOO MUCH ABOUT DIFFERENT THINGS: 0
1. FEELING NERVOUS, ANXIOUS, OR ON EDGE: 0
7. FEELING AFRAID AS IF SOMETHING AWFUL MIGHT HAPPEN: 0

## 2022-06-01 ASSESSMENT — PATIENT HEALTH QUESTIONNAIRE - PHQ9
SUM OF ALL RESPONSES TO PHQ QUESTIONS 1-9: 0
2. FEELING DOWN, DEPRESSED OR HOPELESS: 0
SUM OF ALL RESPONSES TO PHQ9 QUESTIONS 1 & 2: 0
SUM OF ALL RESPONSES TO PHQ QUESTIONS 1-9: 0
SUM OF ALL RESPONSES TO PHQ QUESTIONS 1-9: 0
1. LITTLE INTEREST OR PLEASURE IN DOING THINGS: 0
SUM OF ALL RESPONSES TO PHQ QUESTIONS 1-9: 0

## 2022-06-01 ASSESSMENT — SOCIAL DETERMINANTS OF HEALTH (SDOH): HOW HARD IS IT FOR YOU TO PAY FOR THE VERY BASICS LIKE FOOD, HOUSING, MEDICAL CARE, AND HEATING?: NOT HARD AT ALL

## 2022-06-01 NOTE — PROGRESS NOTES
Patient: Jennifer Jorge is a 57 y.o. female who presents today with the following Chief Complaint(s):    Chief Complaint   Patient presents with    Diabetes         HPIShe is here for a check up and f/u on HTN, DM, T2, HLD, with h/o multiple strokes with residual aphasia, memory and cognitive challenges and left sided weakness. She is accompanied daughter who is her primary caretaker. There has been a significant problem with medication compliance which has led to the multiple stroke history. Currently not taking b/p medication with elevated b/p. Send meds to [facility]. Current Outpatient Medications   Medication Sig Dispense Refill    hydrALAZINE (APRESOLINE) 50 MG tablet TAKE ONE (1) TABLET BY MOUTH EVERY EIGHT (8) HOURS. (Patient not taking: Reported on 6/1/2022) 90 tablet 0    amLODIPine (NORVASC) 10 MG tablet TAKE 1 TABLET BY MOUTH ONCE DAILY. (Patient not taking: Reported on 6/1/2022) 30 tablet 0    QUEtiapine (SEROQUEL) 100 MG tablet TAKE ONE TABLET BY MOUTH AT BEDTIME. (Patient not taking: Reported on 6/1/2022) 28 tablet 0    atorvastatin (LIPITOR) 40 MG tablet TAKE 1 TABLET BY MOUTH ONCE DAILY. (Patient not taking: Reported on 6/1/2022) 28 tablet 0    metFORMIN (GLUCOPHAGE-XR) 500 MG extended release tablet TAKE ONE (1) TABLET BY MOUTH TWICE A DAY. (Patient not taking: Reported on 6/1/2022) 56 tablet 0    furosemide (LASIX) 20 MG tablet TAKE ONE TABLET BY MOUTH ONCE A DAY. (Patient not taking: Reported on 6/1/2022) 28 tablet 0    hydroCHLOROthiazide (HYDRODIURIL) 25 MG tablet TAKE 1 TABLET BY MOUTH ONCE DAILY. (Patient not taking: Reported on 6/1/2022) 28 tablet 0    lisinopril (PRINIVIL;ZESTRIL) 10 MG tablet TAKE 1 TABLET BY MOUTH ONCE DAILY. (Patient not taking: Reported on 6/1/2022) 28 tablet 0    Handicap Placard MISC by Does not apply route Exp 9/1/2022 (Patient not taking: Reported on 6/1/2022) 1 each 0    amLODIPine (NORVASC) 5 MG tablet TAKE 1 TABLET BY MOUTH ONCE DAILY.  (Patient not taking: Reported on 6/1/2022) 28 tablet 5    ondansetron (ZOFRAN) 4 MG tablet Take 1 tablet by mouth every 12 hours as needed for Nausea or Vomiting (Patient not taking: Reported on 6/1/2022) 12 tablet 0    melatonin 3 MG TABS tablet Take 1 tablet by mouth nightly as needed (prn) (Patient not taking: Reported on 6/1/2022) 30 tablet 2    vitamin D (ERGOCALCIFEROL) 50716 units CAPS capsule Take 1 capsule by mouth once a week (Patient not taking: Reported on 6/1/2022) 5 capsule 3     No current facility-administered medications for this visit. Patient's past medical history, surgical history, family history, medications,and allergies  were all reviewed and updated as appropriate today. Review of Systems   Constitutional: Negative. HENT: Negative. Eyes: Negative. Respiratory: Negative. Cardiovascular:        Hypertension, see HPI. Gastrointestinal: Negative. Endocrine:        Hyperlipidemia, treated with statin. DM, T2.   Genitourinary: Negative. Musculoskeletal: Negative. Skin: Negative. Neurological:        H/O CVD with multiple strokes. See HPI. Seizure disorder. Stable. Psychiatric/Behavioral:        Memory and cognitive challenges. See HPI. Physical Exam  Constitutional:       General: She is not in acute distress. Appearance: She is well-developed. HENT:      Head: Normocephalic and atraumatic. Right Ear: External ear normal.      Left Ear: External ear normal.      Nose: Nose normal.   Eyes:      General: No scleral icterus. Conjunctiva/sclera: Conjunctivae normal.      Pupils: Pupils are equal, round, and reactive to light. Neck:      Thyroid: No thyromegaly. Cardiovascular:      Rate and Rhythm: Normal rate and regular rhythm. Heart sounds: Normal heart sounds. Pulmonary:      Effort: Pulmonary effort is normal.      Breath sounds: Normal breath sounds.    Abdominal:      General: Bowel sounds are normal.      Palpations: Abdomen is soft. There is no mass. Musculoskeletal:      Cervical back: Normal range of motion and neck supple. Lymphadenopathy:      Cervical: No cervical adenopathy. Skin:     General: Skin is warm and dry. Neurological:      Mental Status: She is alert. Deep Tendon Reflexes: Reflexes are normal and symmetric. Comments: Left hemiparesis. Psychiatric:         Behavior: Behavior normal.      Comments: Follows basic commands. Voices concerns. Vitals:    06/01/22 1423   BP: (!) 170/90   Pulse: 70   Temp: 97.3 °F (36.3 °C)   SpO2: 95%       Assessment:   Diagnosis Orders   1. Type 2 diabetes mellitus with complication, without long-term current use of insulin (HCC)  Diet and physical activity discussed. POCT Glucose    POCT glycosylated hemoglobin (Hb A1C)    PASCUAL - Rootring, Margarite Lanes., DPM, Podiatry, [place]   2. Primary hypertension  Continue same med regimen. See list.   DASH and low sodium diet discussed. Comprehensive Metabolic Panel    CBC with Auto Differential    TSH with Reflex    MICROALBUMIN / CREATININE URINE RATIO    Urinalysis   3. Mixed hyperlipidemia  Lipid Panel  Heart healthy diet and statin compliance discussed. 4. Seizure disorder ([address]  Stable. Proper rest.  Healthy diet discussed. 5. H/O: stroke  [facility] [place]  Conditioning, strengthening. 6. Anemia, chronic disease  Ferritin     ADDENDUM: Ms Danny Dean has a history of CVD, stroke and left sided weakness. Her mobility is compromised related to toileting, dressing, grooming, bathing. MRADL deficits increase her chances for accidents and injury if attempted without assistance. A cane or walker cannot sufficiently resolve her mobility limitations. Her home provides adequate space for use of stated wheelchair. A wheelchair will significantly enhance her ability to carry out MRADL. She has expressed a willingness to use a praveen wheelchair in the home.  She has the mental and physical capacity to operate a Loyd-Bojorquez wheelchair. The beneficiary has a caregiver who is available, willing and able to provide assistance with the wheelchair. Plan:  See plans above.

## 2022-06-01 NOTE — PATIENT INSTRUCTIONS
[facility] Laboratory Locations - No appointment necessary. @ indicates the location is open Saturdays in addition to Monday through Friday. Call your preferred location for test preparation, business hours and other information you need. [employer] accepts [employer]. [facility]    @ [place] [facility] S[facility] 3 [facility] Marlyn Merida. [place], [address]   Ph: [phone] [facility] S[facility]. [address]   Ph: [phone]  @ [facility] Svcs. [address] [facility]   [facility]   Ph: [phone]    [facility] Svcs. [address] [facility] JusticeyoelmikeyVinicio jacksonezek[address]   Ph: [phone] @ [place] [facility] Svcs. [address]  Ph: [phone] @ [facility] Svcs. 8[address] [facility]. Pascual Rodriguez [facility]   Ph: [phone]    [place]   @ [facility] Svcs. [address] [place], [place] [address]   Ph: [phone] [place] [facility] [address] [facility], [address] [place] [address]  Ph: [phone]:  [address] Lab Svcs[address] 54 [facility]   Ph: [phone] [facility]. Lab Svcs.   [address]   Ph: [phone]

## 2022-06-06 DIAGNOSIS — R60.0 BILATERAL LEG EDEMA: Primary | ICD-10-CM

## 2022-06-06 DIAGNOSIS — E11.8 TYPE 2 DIABETES MELLITUS WITH COMPLICATION, WITHOUT LONG-TERM CURRENT USE OF INSULIN (HCC): ICD-10-CM

## 2022-06-06 DIAGNOSIS — I10 ESSENTIAL HYPERTENSION: ICD-10-CM

## 2022-06-06 DIAGNOSIS — F51.01 PRIMARY INSOMNIA: ICD-10-CM

## 2022-06-06 DIAGNOSIS — E78.00 PURE HYPERCHOLESTEROLEMIA: ICD-10-CM

## 2022-06-07 RX ORDER — LISINOPRIL 10 MG/1
TABLET ORAL
Qty: 28 TABLET | Refills: 0 | Status: SHIPPED | OUTPATIENT
Start: 2022-06-07

## 2022-06-07 RX ORDER — METFORMIN HYDROCHLORIDE 500 MG/1
TABLET, EXTENDED RELEASE ORAL
Qty: 56 TABLET | Refills: 0 | Status: SHIPPED | OUTPATIENT
Start: 2022-06-07

## 2022-06-07 RX ORDER — ATORVASTATIN CALCIUM 40 MG/1
TABLET, FILM COATED ORAL
Qty: 28 TABLET | Refills: 0 | Status: SHIPPED | OUTPATIENT
Start: 2022-06-07

## 2022-06-07 RX ORDER — FUROSEMIDE 20 MG/1
TABLET ORAL
Qty: 28 TABLET | Refills: 0 | Status: SHIPPED | OUTPATIENT
Start: 2022-06-07 | End: 2022-07-13

## 2022-06-07 RX ORDER — QUETIAPINE FUMARATE 100 MG/1
TABLET, FILM COATED ORAL
Qty: 28 TABLET | Refills: 0 | Status: SHIPPED | OUTPATIENT
Start: 2022-06-07 | End: 2022-06-09

## 2022-06-07 RX ORDER — HYDROCHLOROTHIAZIDE 25 MG/1
TABLET ORAL
Qty: 28 TABLET | Refills: 0 | Status: SHIPPED | OUTPATIENT
Start: 2022-06-07

## 2022-06-08 DIAGNOSIS — F51.01 PRIMARY INSOMNIA: ICD-10-CM

## 2022-06-09 RX ORDER — QUETIAPINE FUMARATE 100 MG/1
TABLET, FILM COATED ORAL
Qty: 28 TABLET | Refills: 0 | Status: SHIPPED | OUTPATIENT
Start: 2022-06-09

## 2022-06-13 ENCOUNTER — HOSPITAL ENCOUNTER (OUTPATIENT)
Dept: PHYSICAL THERAPY | Age: 62
Setting detail: THERAPIES SERIES
Discharge: HOME | End: 2022-06-13

## 2022-06-15 RX ORDER — QUETIAPINE FUMARATE 100 MG/1
100 TABLET, FILM COATED ORAL NIGHTLY
Qty: 30 TABLET | Refills: 5 | Status: SHIPPED | OUTPATIENT
Start: 2022-06-15

## 2022-06-15 RX ORDER — ATORVASTATIN CALCIUM 40 MG/1
40 TABLET, FILM COATED ORAL DAILY
Qty: 30 TABLET | Refills: 5 | Status: SHIPPED | OUTPATIENT
Start: 2022-06-15

## 2022-06-15 RX ORDER — AMLODIPINE BESYLATE 10 MG/1
10 TABLET ORAL DAILY
Qty: 30 TABLET | Refills: 5 | Status: SHIPPED | OUTPATIENT
Start: 2022-06-15

## 2022-06-15 RX ORDER — HYDROCHLOROTHIAZIDE 25 MG/1
25 TABLET ORAL EVERY MORNING
Qty: 30 TABLET | Refills: 5 | Status: SHIPPED | OUTPATIENT
Start: 2022-06-15

## 2022-06-15 RX ORDER — METFORMIN HYDROCHLORIDE 500 MG/1
500 TABLET, EXTENDED RELEASE ORAL
Qty: 30 TABLET | Refills: 5 | Status: SHIPPED | OUTPATIENT
Start: 2022-06-15

## 2022-06-15 RX ORDER — LISINOPRIL 10 MG/1
10 TABLET ORAL DAILY
Qty: 30 TABLET | Refills: 5 | Status: SHIPPED | OUTPATIENT
Start: 2022-06-15

## 2022-06-15 ASSESSMENT — ENCOUNTER SYMPTOMS
GASTROINTESTINAL NEGATIVE: 1
RESPIRATORY NEGATIVE: 1
EYES NEGATIVE: 1

## 2022-06-17 RX ORDER — HYDRALAZINE HYDROCHLORIDE 50 MG/1
TABLET, FILM COATED ORAL
Qty: 90 TABLET | Refills: 5 | Status: SHIPPED | OUTPATIENT
Start: 2022-06-17

## 2022-07-13 DIAGNOSIS — R60.0 BILATERAL LEG EDEMA: ICD-10-CM

## 2022-07-13 RX ORDER — FUROSEMIDE 20 MG/1
TABLET ORAL
Qty: 28 TABLET | Refills: 0 | Status: SHIPPED | OUTPATIENT
Start: 2022-07-13 | End: 2022-08-23

## 2022-07-21 ENCOUNTER — TELEPHONE (OUTPATIENT)
Dept: INTERNAL MEDICINE CLINIC | Age: 62
End: 2022-07-21

## 2022-07-21 NOTE — TELEPHONE ENCOUNTER
Office notes where faxed over 7/7 for an addedum to the order for a wheel chair. The order did not have any mention to why the patient needs a wheel chair and why cant they use a cane or a walker.   Please advise

## 2022-08-22 DIAGNOSIS — R60.0 BILATERAL LEG EDEMA: ICD-10-CM

## 2022-08-23 RX ORDER — FUROSEMIDE 20 MG/1
TABLET ORAL
Qty: 90 TABLET | Refills: 1 | Status: SHIPPED | OUTPATIENT
Start: 2022-08-23

## 2022-10-26 ENCOUNTER — TELEPHONE (OUTPATIENT)
Dept: INTERNAL MEDICINE CLINIC | Age: 62
End: 2022-10-26

## 2022-10-26 NOTE — TELEPHONE ENCOUNTER
Patients daughter called wanted to set up appointment to get fmla paperwork done. There is no availability on scheduled and patient's daughter states that they can do a virtual if possible do to it being difficult to get her mother in and out of home.

## 2022-12-19 RX ORDER — METFORMIN HYDROCHLORIDE 500 MG/1
TABLET, EXTENDED RELEASE ORAL
Qty: 90 TABLET | Refills: 1 | Status: SHIPPED | OUTPATIENT
Start: 2022-12-19

## 2022-12-19 RX ORDER — LISINOPRIL 10 MG/1
TABLET ORAL
Qty: 90 TABLET | Refills: 1 | Status: SHIPPED | OUTPATIENT
Start: 2022-12-19

## 2022-12-19 RX ORDER — AMLODIPINE BESYLATE 10 MG/1
TABLET ORAL
Qty: 90 TABLET | Refills: 1 | Status: SHIPPED | OUTPATIENT
Start: 2022-12-19

## 2022-12-19 RX ORDER — ATORVASTATIN CALCIUM 40 MG/1
TABLET, FILM COATED ORAL
Qty: 90 TABLET | Refills: 1 | Status: SHIPPED | OUTPATIENT
Start: 2022-12-19

## 2022-12-19 RX ORDER — HYDRALAZINE HYDROCHLORIDE 50 MG/1
TABLET, FILM COATED ORAL
Qty: 270 TABLET | Refills: 1 | Status: SHIPPED | OUTPATIENT
Start: 2022-12-19

## 2022-12-19 RX ORDER — QUETIAPINE FUMARATE 100 MG/1
TABLET, FILM COATED ORAL
Qty: 90 TABLET | Refills: 1 | Status: SHIPPED | OUTPATIENT
Start: 2022-12-19

## 2022-12-19 RX ORDER — HYDROCHLOROTHIAZIDE 25 MG/1
TABLET ORAL
Qty: 90 TABLET | Refills: 1 | Status: SHIPPED | OUTPATIENT
Start: 2022-12-19

## 2022-12-19 NOTE — TELEPHONE ENCOUNTER
Medication:   Requested Prescriptions     Pending Prescriptions Disp Refills    atorvastatin (LIPITOR) 40 MG tablet [Pharmacy Med Name: ATORVASTATIN 40 MG TAB] 90 tablet 0     Sig: TAKE 1 TABLET BY MOUTH ONCE DAILY. amLODIPine (NORVASC) 10 MG tablet [Pharmacy Med Name: AMLODIPINE 10 MG TABLET] 90 tablet 0     Sig: TAKE 1 TABLET BY MOUTH ONCE DAILY. hydroCHLOROthiazide (HYDRODIURIL) 25 MG tablet [Pharmacy Med Name: HYDROCHLOROTHIAZIDE 25 MG TAB*] 90 tablet 0     Sig: TAKE 1 TABLET BY MOUTH ONCE DAILY. lisinopril (PRINIVIL;ZESTRIL) 10 MG tablet [Pharmacy Med Name: LISINOPRIL 10 MG TABLET] 90 tablet 0     Sig: TAKE 1 TABLET BY MOUTH ONCE DAILY. QUEtiapine (SEROQUEL) 100 MG tablet [Pharmacy Med Name: QUETIAPINE FUMARATE 100 MG] 90 tablet 0     Sig: TAKE ONE TABLET BY MOUTH AT BEDTIME.    metFORMIN (GLUCOPHAGE-XR) 500 MG extended release tablet [Pharmacy Med Name: METFORMIN  MG TABLET] 90 tablet 0     Sig: TAKE ONE (1) TABLET BY MOUTH DAILY WITH BREAKFAST    hydrALAZINE (APRESOLINE) 50 MG tablet [Pharmacy Med Name: HYDRALAZINE 50 MG TABLET] 270 tablet 0     Sig: TAKE ONE (1) TABLET BY MOUTH EVERY EIGHT (8) HOURS.        Last Filled:      Patient Phone Number: [phone] (home)     Last appt: 6/1/2022   Next appt: no future appointment scheduled

## 2023-01-17 DIAGNOSIS — R60.0 BILATERAL LEG EDEMA: ICD-10-CM

## 2023-01-17 NOTE — TELEPHONE ENCOUNTER
Medication:   Requested Prescriptions     Pending Prescriptions Disp Refills    furosemide (LASIX) 20 MG tablet [Pharmacy Med Name: FUROSEMIDE 20 MG TABLET] 90 tablet 0     Sig: TAKE ONE TABLET BY MOUTH ONCE A DAY.         Last Filled: 8/23/22    Patient Phone Number: [phone] (home)     Last appt: 6/1/2022   Next appt: no future appointment scheduled

## 2023-01-19 RX ORDER — FUROSEMIDE 20 MG/1
TABLET ORAL
Qty: 90 TABLET | Refills: 0 | Status: SHIPPED | OUTPATIENT
Start: 2023-01-19

## 2023-06-08 RX ORDER — LISINOPRIL 10 MG/1
TABLET ORAL
Qty: 31 TABLET | Refills: 11 | OUTPATIENT
Start: 2023-06-08

## 2023-06-08 RX ORDER — HYDRALAZINE HYDROCHLORIDE 50 MG/1
TABLET, FILM COATED ORAL
Qty: 93 TABLET | Refills: 11 | OUTPATIENT
Start: 2023-06-08

## 2023-06-08 RX ORDER — METFORMIN HYDROCHLORIDE 500 MG/1
TABLET, EXTENDED RELEASE ORAL
Qty: 31 TABLET | Refills: 11 | OUTPATIENT
Start: 2023-06-08